# Patient Record
Sex: FEMALE | Race: WHITE | NOT HISPANIC OR LATINO | URBAN - METROPOLITAN AREA
[De-identification: names, ages, dates, MRNs, and addresses within clinical notes are randomized per-mention and may not be internally consistent; named-entity substitution may affect disease eponyms.]

---

## 2017-05-06 ENCOUNTER — OUTPATIENT (OUTPATIENT)
Dept: OUTPATIENT SERVICES | Facility: HOSPITAL | Age: 67
LOS: 1 days | Discharge: HOME | End: 2017-05-06

## 2017-06-28 DIAGNOSIS — R07.9 CHEST PAIN, UNSPECIFIED: ICD-10-CM

## 2018-06-16 ENCOUNTER — OUTPATIENT (OUTPATIENT)
Dept: OUTPATIENT SERVICES | Facility: HOSPITAL | Age: 68
LOS: 1 days | Discharge: HOME | End: 2018-06-16

## 2018-06-16 DIAGNOSIS — E78.2 MIXED HYPERLIPIDEMIA: ICD-10-CM

## 2018-06-16 DIAGNOSIS — Z00.00 ENCOUNTER FOR GENERAL ADULT MEDICAL EXAMINATION WITHOUT ABNORMAL FINDINGS: ICD-10-CM

## 2018-06-16 DIAGNOSIS — E55.9 VITAMIN D DEFICIENCY, UNSPECIFIED: ICD-10-CM

## 2018-06-16 DIAGNOSIS — I10 ESSENTIAL (PRIMARY) HYPERTENSION: ICD-10-CM

## 2018-10-06 ENCOUNTER — TRANSCRIPTION ENCOUNTER (OUTPATIENT)
Age: 68
End: 2018-10-06

## 2018-10-27 ENCOUNTER — OUTPATIENT (OUTPATIENT)
Dept: OUTPATIENT SERVICES | Facility: HOSPITAL | Age: 68
LOS: 1 days | Discharge: HOME | End: 2018-10-27

## 2018-10-27 DIAGNOSIS — R10.13 EPIGASTRIC PAIN: ICD-10-CM

## 2019-03-07 ENCOUNTER — OUTPATIENT (OUTPATIENT)
Dept: OUTPATIENT SERVICES | Facility: HOSPITAL | Age: 69
LOS: 1 days | Discharge: HOME | End: 2019-03-07

## 2019-03-07 DIAGNOSIS — E55.9 VITAMIN D DEFICIENCY, UNSPECIFIED: ICD-10-CM

## 2019-03-07 DIAGNOSIS — I10 ESSENTIAL (PRIMARY) HYPERTENSION: ICD-10-CM

## 2019-03-07 DIAGNOSIS — K21.0 GASTRO-ESOPHAGEAL REFLUX DISEASE WITH ESOPHAGITIS: ICD-10-CM

## 2019-03-07 DIAGNOSIS — E78.2 MIXED HYPERLIPIDEMIA: ICD-10-CM

## 2019-03-07 DIAGNOSIS — Z00.01 ENCOUNTER FOR GENERAL ADULT MEDICAL EXAMINATION WITH ABNORMAL FINDINGS: ICD-10-CM

## 2019-05-14 ENCOUNTER — APPOINTMENT (OUTPATIENT)
Dept: CARDIOLOGY | Facility: CLINIC | Age: 69
End: 2019-05-14
Payer: MEDICARE

## 2019-05-14 PROCEDURE — 93306 TTE W/DOPPLER COMPLETE: CPT

## 2019-05-28 ENCOUNTER — APPOINTMENT (OUTPATIENT)
Dept: CARDIOLOGY | Facility: CLINIC | Age: 69
End: 2019-05-28
Payer: MEDICARE

## 2019-05-28 PROCEDURE — 93000 ELECTROCARDIOGRAM COMPLETE: CPT

## 2019-05-28 PROCEDURE — 99214 OFFICE O/P EST MOD 30 MIN: CPT

## 2019-12-10 ENCOUNTER — APPOINTMENT (OUTPATIENT)
Dept: CARDIOLOGY | Facility: CLINIC | Age: 69
End: 2019-12-10
Payer: MEDICARE

## 2019-12-10 PROCEDURE — 99214 OFFICE O/P EST MOD 30 MIN: CPT

## 2019-12-10 PROCEDURE — 93000 ELECTROCARDIOGRAM COMPLETE: CPT

## 2020-04-10 ENCOUNTER — APPOINTMENT (OUTPATIENT)
Dept: OTOLARYNGOLOGY | Facility: CLINIC | Age: 70
End: 2020-04-10

## 2020-05-18 ENCOUNTER — LABORATORY RESULT (OUTPATIENT)
Age: 70
End: 2020-05-18

## 2020-05-19 ENCOUNTER — APPOINTMENT (OUTPATIENT)
Dept: CARDIOLOGY | Facility: CLINIC | Age: 70
End: 2020-05-19
Payer: MEDICARE

## 2020-05-19 PROCEDURE — 93306 TTE W/DOPPLER COMPLETE: CPT

## 2020-05-26 ENCOUNTER — APPOINTMENT (OUTPATIENT)
Dept: CARDIOLOGY | Facility: CLINIC | Age: 70
End: 2020-05-26
Payer: MEDICARE

## 2020-05-26 PROCEDURE — 99214 OFFICE O/P EST MOD 30 MIN: CPT

## 2020-05-26 PROCEDURE — 93000 ELECTROCARDIOGRAM COMPLETE: CPT

## 2020-09-29 ENCOUNTER — RECORD ABSTRACTING (OUTPATIENT)
Age: 70
End: 2020-09-29

## 2020-09-29 DIAGNOSIS — Z78.9 OTHER SPECIFIED HEALTH STATUS: ICD-10-CM

## 2020-09-29 DIAGNOSIS — I65.23 OCCLUSION AND STENOSIS OF BILATERAL CAROTID ARTERIES: ICD-10-CM

## 2020-09-29 DIAGNOSIS — I35.1 NONRHEUMATIC AORTIC (VALVE) INSUFFICIENCY: ICD-10-CM

## 2020-09-29 DIAGNOSIS — I36.0 NONRHEUMATIC TRICUSPID (VALVE) STENOSIS: ICD-10-CM

## 2020-11-10 ENCOUNTER — APPOINTMENT (OUTPATIENT)
Dept: CARDIOLOGY | Facility: CLINIC | Age: 70
End: 2020-11-10
Payer: MEDICARE

## 2020-11-10 VITALS
HEART RATE: 67 BPM | HEIGHT: 66 IN | SYSTOLIC BLOOD PRESSURE: 150 MMHG | TEMPERATURE: 97.7 F | BODY MASS INDEX: 26.52 KG/M2 | DIASTOLIC BLOOD PRESSURE: 80 MMHG | WEIGHT: 165 LBS

## 2020-11-10 PROCEDURE — 99072 ADDL SUPL MATRL&STAF TM PHE: CPT

## 2020-11-10 PROCEDURE — 99213 OFFICE O/P EST LOW 20 MIN: CPT

## 2020-11-10 PROCEDURE — 93000 ELECTROCARDIOGRAM COMPLETE: CPT

## 2021-05-22 ENCOUNTER — TRANSCRIPTION ENCOUNTER (OUTPATIENT)
Age: 71
End: 2021-05-22

## 2021-06-04 ENCOUNTER — APPOINTMENT (OUTPATIENT)
Dept: CARDIOLOGY | Facility: CLINIC | Age: 71
End: 2021-06-04
Payer: MEDICARE

## 2021-06-04 PROCEDURE — 93306 TTE W/DOPPLER COMPLETE: CPT

## 2021-06-15 ENCOUNTER — APPOINTMENT (OUTPATIENT)
Dept: CARDIOLOGY | Facility: CLINIC | Age: 71
End: 2021-06-15
Payer: MEDICARE

## 2021-06-15 VITALS
WEIGHT: 172 LBS | TEMPERATURE: 97.8 F | HEIGHT: 66 IN | DIASTOLIC BLOOD PRESSURE: 80 MMHG | BODY MASS INDEX: 27.64 KG/M2 | SYSTOLIC BLOOD PRESSURE: 140 MMHG | HEART RATE: 60 BPM | OXYGEN SATURATION: 98 %

## 2021-06-15 PROCEDURE — 93000 ELECTROCARDIOGRAM COMPLETE: CPT

## 2021-06-15 PROCEDURE — 99213 OFFICE O/P EST LOW 20 MIN: CPT

## 2021-06-15 RX ORDER — LATANOPROST/PF 0.005 %
0.01 DROPS OPHTHALMIC (EYE)
Refills: 0 | Status: DISCONTINUED | COMMUNITY

## 2021-06-18 ENCOUNTER — APPOINTMENT (OUTPATIENT)
Dept: OTOLARYNGOLOGY | Facility: CLINIC | Age: 71
End: 2021-06-18
Payer: MEDICARE

## 2021-06-18 DIAGNOSIS — H92.09 OTALGIA, UNSPECIFIED EAR: ICD-10-CM

## 2021-06-18 DIAGNOSIS — H91.93 UNSPECIFIED HEARING LOSS, BILATERAL: ICD-10-CM

## 2021-06-18 PROCEDURE — 92550 TYMPANOMETRY & REFLEX THRESH: CPT

## 2021-06-18 PROCEDURE — 92557 COMPREHENSIVE HEARING TEST: CPT

## 2021-06-18 PROCEDURE — 99203 OFFICE O/P NEW LOW 30 MIN: CPT | Mod: 25

## 2021-06-18 NOTE — PHYSICAL EXAM
[Midline] : trachea located in midline position [Normal] : no rashes [de-identified] : left tmj pain on palpation. [de-identified] : hypertrophic engorged

## 2021-06-18 NOTE — ASSESSMENT
[FreeTextEntry1] : I reviewed, interpreted, and discussed the Audiogram done today. bilateral mild hearing loss. Left mixed. \par \par I explained to the patient the pathophysiology of TMJ dysfunction, causing referred otalgia. I showed the impact of an  uneven bite/occlusion. \par During painful episodes, I recommended using slightly warm compresses to relieve the spasm of the masticators muscles, eating soft food, masticating on both sides of the jaw instead of one side,  in addition to using NSAIDs. I also explained the risks of side effects related to NSAIDs including stomach ulcers and recommended gastric protection while using NSAIDs. \par I also discussed the importance of seeing the dentist to align the bite to avoid a recurrence of the problem down the road.\par \par

## 2021-06-18 NOTE — HISTORY OF PRESENT ILLNESS
[de-identified] : Patient presents today c/o  right ear pain . Started 1 month ago.   Was seen in Urgent care was prescribed antibiotic .  Pain was in left ear. Denies any history of ear infections.  She remains with tinnitus at right which is constant.

## 2022-04-12 ENCOUNTER — APPOINTMENT (OUTPATIENT)
Dept: CARDIOLOGY | Facility: CLINIC | Age: 72
End: 2022-04-12
Payer: MEDICARE

## 2022-04-12 VITALS
BODY MASS INDEX: 26.97 KG/M2 | WEIGHT: 167.8 LBS | HEIGHT: 66 IN | DIASTOLIC BLOOD PRESSURE: 78 MMHG | SYSTOLIC BLOOD PRESSURE: 140 MMHG

## 2022-04-12 PROCEDURE — 93000 ELECTROCARDIOGRAM COMPLETE: CPT

## 2022-04-12 PROCEDURE — 99213 OFFICE O/P EST LOW 20 MIN: CPT

## 2022-04-12 RX ORDER — ASPIRIN ENTERIC COATED TABLETS 81 MG 81 MG/1
81 TABLET, DELAYED RELEASE ORAL DAILY
Qty: 90 | Refills: 3 | Status: ACTIVE | COMMUNITY
Start: 2022-04-12 | End: 1900-01-01

## 2022-04-12 RX ORDER — METOPROLOL SUCCINATE 25 MG/1
25 TABLET, EXTENDED RELEASE ORAL DAILY
Qty: 90 | Refills: 3 | Status: DISCONTINUED | COMMUNITY
End: 2022-04-12

## 2022-05-26 ENCOUNTER — NON-APPOINTMENT (OUTPATIENT)
Age: 72
End: 2022-05-26

## 2022-05-27 ENCOUNTER — EMERGENCY (EMERGENCY)
Facility: HOSPITAL | Age: 72
LOS: 0 days | Discharge: HOME | End: 2022-05-28
Attending: STUDENT IN AN ORGANIZED HEALTH CARE EDUCATION/TRAINING PROGRAM | Admitting: STUDENT IN AN ORGANIZED HEALTH CARE EDUCATION/TRAINING PROGRAM
Payer: MEDICARE

## 2022-05-27 VITALS
HEART RATE: 67 BPM | OXYGEN SATURATION: 99 % | RESPIRATION RATE: 17 BRPM | WEIGHT: 169.98 LBS | DIASTOLIC BLOOD PRESSURE: 113 MMHG | TEMPERATURE: 97 F | SYSTOLIC BLOOD PRESSURE: 245 MMHG | HEIGHT: 66 IN

## 2022-05-27 DIAGNOSIS — I10 ESSENTIAL (PRIMARY) HYPERTENSION: ICD-10-CM

## 2022-05-27 LAB
ALBUMIN SERPL ELPH-MCNC: 4.5 G/DL — SIGNIFICANT CHANGE UP (ref 3.5–5.2)
ALP SERPL-CCNC: 56 U/L — SIGNIFICANT CHANGE UP (ref 30–115)
ALT FLD-CCNC: 14 U/L — SIGNIFICANT CHANGE UP (ref 0–41)
ANION GAP SERPL CALC-SCNC: 11 MMOL/L — SIGNIFICANT CHANGE UP (ref 7–14)
APPEARANCE UR: CLEAR — SIGNIFICANT CHANGE UP
AST SERPL-CCNC: 22 U/L — SIGNIFICANT CHANGE UP (ref 0–41)
BASOPHILS # BLD AUTO: 0.02 K/UL — SIGNIFICANT CHANGE UP (ref 0–0.2)
BASOPHILS NFR BLD AUTO: 0.3 % — SIGNIFICANT CHANGE UP (ref 0–1)
BILIRUB SERPL-MCNC: <0.2 MG/DL — SIGNIFICANT CHANGE UP (ref 0.2–1.2)
BILIRUB UR-MCNC: NEGATIVE — SIGNIFICANT CHANGE UP
BUN SERPL-MCNC: 12 MG/DL — SIGNIFICANT CHANGE UP (ref 10–20)
CALCIUM SERPL-MCNC: 9.5 MG/DL — SIGNIFICANT CHANGE UP (ref 8.5–10.1)
CHLORIDE SERPL-SCNC: 100 MMOL/L — SIGNIFICANT CHANGE UP (ref 98–110)
CO2 SERPL-SCNC: 25 MMOL/L — SIGNIFICANT CHANGE UP (ref 17–32)
COLOR SPEC: YELLOW — SIGNIFICANT CHANGE UP
CREAT SERPL-MCNC: 0.8 MG/DL — SIGNIFICANT CHANGE UP (ref 0.7–1.5)
DIFF PNL FLD: NEGATIVE — SIGNIFICANT CHANGE UP
EGFR: 79 ML/MIN/1.73M2 — SIGNIFICANT CHANGE UP
EOSINOPHIL # BLD AUTO: 0.13 K/UL — SIGNIFICANT CHANGE UP (ref 0–0.7)
EOSINOPHIL NFR BLD AUTO: 2.3 % — SIGNIFICANT CHANGE UP (ref 0–8)
EPI CELLS # UR: ABNORMAL /HPF
GLUCOSE SERPL-MCNC: 110 MG/DL — HIGH (ref 70–99)
GLUCOSE UR QL: NEGATIVE MG/DL — SIGNIFICANT CHANGE UP
HCT VFR BLD CALC: 38.8 % — SIGNIFICANT CHANGE UP (ref 37–47)
HGB BLD-MCNC: 12.8 G/DL — SIGNIFICANT CHANGE UP (ref 12–16)
IMM GRANULOCYTES NFR BLD AUTO: 0.2 % — SIGNIFICANT CHANGE UP (ref 0.1–0.3)
KETONES UR-MCNC: NEGATIVE — SIGNIFICANT CHANGE UP
LEUKOCYTE ESTERASE UR-ACNC: ABNORMAL
LYMPHOCYTES # BLD AUTO: 2.27 K/UL — SIGNIFICANT CHANGE UP (ref 1.2–3.4)
LYMPHOCYTES # BLD AUTO: 39.5 % — SIGNIFICANT CHANGE UP (ref 20.5–51.1)
MCHC RBC-ENTMCNC: 29.6 PG — SIGNIFICANT CHANGE UP (ref 27–31)
MCHC RBC-ENTMCNC: 33 G/DL — SIGNIFICANT CHANGE UP (ref 32–37)
MCV RBC AUTO: 89.8 FL — SIGNIFICANT CHANGE UP (ref 81–99)
MONOCYTES # BLD AUTO: 0.43 K/UL — SIGNIFICANT CHANGE UP (ref 0.1–0.6)
MONOCYTES NFR BLD AUTO: 7.5 % — SIGNIFICANT CHANGE UP (ref 1.7–9.3)
NEUTROPHILS # BLD AUTO: 2.89 K/UL — SIGNIFICANT CHANGE UP (ref 1.4–6.5)
NEUTROPHILS NFR BLD AUTO: 50.2 % — SIGNIFICANT CHANGE UP (ref 42.2–75.2)
NITRITE UR-MCNC: NEGATIVE — SIGNIFICANT CHANGE UP
NRBC # BLD: 0 /100 WBCS — SIGNIFICANT CHANGE UP (ref 0–0)
NT-PROBNP SERPL-SCNC: 157 PG/ML — SIGNIFICANT CHANGE UP (ref 0–300)
PH UR: 7 — SIGNIFICANT CHANGE UP (ref 5–8)
PLATELET # BLD AUTO: 195 K/UL — SIGNIFICANT CHANGE UP (ref 130–400)
POTASSIUM SERPL-MCNC: 3.8 MMOL/L — SIGNIFICANT CHANGE UP (ref 3.5–5)
POTASSIUM SERPL-SCNC: 3.8 MMOL/L — SIGNIFICANT CHANGE UP (ref 3.5–5)
PROT SERPL-MCNC: 7.3 G/DL — SIGNIFICANT CHANGE UP (ref 6–8)
PROT UR-MCNC: NEGATIVE MG/DL — SIGNIFICANT CHANGE UP
RBC # BLD: 4.32 M/UL — SIGNIFICANT CHANGE UP (ref 4.2–5.4)
RBC # FLD: 11.9 % — SIGNIFICANT CHANGE UP (ref 11.5–14.5)
SODIUM SERPL-SCNC: 136 MMOL/L — SIGNIFICANT CHANGE UP (ref 135–146)
SP GR SPEC: 1.01 — SIGNIFICANT CHANGE UP (ref 1.01–1.03)
TROPONIN T SERPL-MCNC: <0.01 NG/ML — SIGNIFICANT CHANGE UP
UROBILINOGEN FLD QL: 0.2 MG/DL — SIGNIFICANT CHANGE UP
WBC # BLD: 5.75 K/UL — SIGNIFICANT CHANGE UP (ref 4.8–10.8)
WBC # FLD AUTO: 5.75 K/UL — SIGNIFICANT CHANGE UP (ref 4.8–10.8)
WBC UR QL: SIGNIFICANT CHANGE UP /HPF

## 2022-05-27 PROCEDURE — 93010 ELECTROCARDIOGRAM REPORT: CPT

## 2022-05-27 PROCEDURE — 99285 EMERGENCY DEPT VISIT HI MDM: CPT

## 2022-05-27 PROCEDURE — 99053 MED SERV 10PM-8AM 24 HR FAC: CPT

## 2022-05-27 RX ORDER — METOPROLOL TARTRATE 50 MG
0 TABLET ORAL
Qty: 0 | Refills: 0 | DISCHARGE

## 2022-05-27 RX ORDER — LISINOPRIL 2.5 MG/1
0 TABLET ORAL
Qty: 0 | Refills: 0 | DISCHARGE

## 2022-05-27 RX ORDER — AMLODIPINE BESYLATE 2.5 MG/1
2.5 TABLET ORAL ONCE
Refills: 0 | Status: COMPLETED | OUTPATIENT
Start: 2022-05-27 | End: 2022-05-27

## 2022-05-27 RX ADMIN — AMLODIPINE BESYLATE 2.5 MILLIGRAM(S): 2.5 TABLET ORAL at 23:55

## 2022-05-27 NOTE — ED PROVIDER NOTE - OBJECTIVE STATEMENT
Patient is a 72-year-old female with past medical history of hypertension currently on metoprolol and losartan presenting with high blood pressure.  Patient reports today she felt pressure like sensation in her head and decided to check her blood pressure.  At the time blood pressure was in the 240s systolic.  Patient took an extra metoprolol and an extra losartan ~ 3 hours ago and later rechecked her pressure and realized it was unchanged so she presented for eval.  She denies any chest pain or shortness of breath no hematuria.  No abdominal pain.No nausea or vomiting, no dizziness,

## 2022-05-27 NOTE — ED PROVIDER NOTE - NS ED MD DISPO DISCHARGE CCDA
Patient/Caregiver provided printed discharge information.
Continue DASH/TLC diet. Defer consistency/texture to team/SLP/GOC. Monitor/adjust as needed./yes

## 2022-05-27 NOTE — ED PROVIDER NOTE - PHYSICAL EXAMINATION
CONSTITUTIONAL: Well-developed; well-nourished; in no acute distress.   SKIN: warm, dry  HEAD: Normocephalic; atraumatic.  EYES: PERRL, EOMI, normal sclera and conjunctiva   ENT: No nasal discharge; airway clear.  NECK: Supple; non tender.  CARD:  Regular rate and rhythm.   RESP: NO inc WOB , speaking in full sentences, lungs CTAB  ABD: soft ntnd  EXT: Normal ROM.  no LE edema no unilateral leg swelling  NEURO: NEURO: AAO x 3, normal speech, no facial asymmetry, negative pronator drift, no ataxia,  sensory equal and intact.  PSYCH: Cooperative, appropriate.

## 2022-05-27 NOTE — ED PROVIDER NOTE - CARE PROVIDERS DIRECT ADDRESSES
[email or web address]

## 2022-05-27 NOTE — ED PROVIDER NOTE - CLINICAL SUMMARY MEDICAL DECISION MAKING FREE TEXT BOX
72-year-old female with past medical history HTN, HLD presents to the ER for elevated blood pressure reading at home with systolic in 240 mmHg.  She is asymptomatic otherwise.  She took all of her blood pressure medications this morning.  Denies chest pain, headache, focal weakness or numbness, urinary changes.  On arrival blood pressure 245/113. Gen - NAD, Head - NCAT, Pharynx - clear, MMM, Heart - RRR, no m/g/r, Lungs - CTAB, no w/c/r, Abdomen - soft, NT, ND, Skin - No rash, Extremities - FROM, no edema, erythema, ecchymosis, brisk cap refill, Neuro - A&O x3, equal strength and sensation, non-focal exam. EKG shows no evidence of LVH, TAWNY, STD. Labs reviewed. Serial BP elevated SBP >240mmHg causing family concern therefore pt offered low dose po amlodipine x1. On reassessment pt Asymptomatic, exam is unremarkable so she was given close follow-up, return precautions and she verbalized understanding.  All questions answered at the bedside. 72-year-old female with past medical history HTN, HLD presents to the ER for elevated blood pressure reading at home with systolic in 240 mmHg.  She is asymptomatic otherwise.  She took all of her blood pressure medications this morning.  Denies chest pain, headache, focal weakness or numbness, urinary changes.  On arrival blood pressure 245/113. Gen - NAD, Head - NCAT, Pharynx - clear, MMM, Heart - RRR, no m/g/r, Lungs - CTAB, no w/c/r, Abdomen - soft, NT, ND, Skin - No rash, Extremities - FROM, no edema, erythema, ecchymosis, brisk cap refill, Neuro - A&O x3, equal strength and sensation, non-focal exam. EKG shows no evidence of LVH, TAWNY, STD. Labs reviewed. Serial BP elevated SBP >240mmHg causing family concern therefore pt offered low dose po amlodipine x1. BP improved, On reassessment pt Asymptomatic, exam is unremarkable so she was given close follow-up, return precautions and she verbalized understanding.  All questions answered at the bedside.

## 2022-05-27 NOTE — ED PROVIDER NOTE - CARE PROVIDER_API CALL
George Zavala)  Family Medicine  [address], [address]  [place] [address]  Phone: [phone]  Fax: [phone]  Follow Up Time:

## 2022-05-27 NOTE — ED PROVIDER NOTE - PATIENT PORTAL LINK FT
You can access the FollowMyHealth Patient Portal offered by [facility] by registering at the following website: [email or web address]. By joining [facility]’s FollowMyHealth portal, you will also be able to view your health information using other applications (apps) compatible with our system.

## 2022-05-27 NOTE — ED PROVIDER NOTE - NS ED ROS FT
Constitutional: (-) fever  Eyes/ENT: (-) blurry vision, (-) epistaxis  Cardiovascular: (-) chest pain, (-) syncope  Respiratory: (-) cough, (-) shortness of breath  Gastrointestinal: (-) vomiting, (-) diarrhea  Musculoskeletal: (-) neck pain, (-) back pain, (-) joint pain  Integumentary: (-) rash, (-) edema  Neurological:  (-) altered mental status  Psychiatric: (-) hallucinations  Allergic/Immunologic: (-) pruritus

## 2022-05-28 VITALS — SYSTOLIC BLOOD PRESSURE: 204 MMHG | DIASTOLIC BLOOD PRESSURE: 93 MMHG | HEART RATE: 65 BPM

## 2022-07-19 NOTE — ED ADULT NURSE NOTE - NSFALLRSKINDICATORS_ED_ALL_ED
no treatment      Pain:   Pain reported in back and LLE. RN aware      Social/Functional History  Lives With: Alone  Type of Home: Senior housing apartment  Home Layout: One level  Home Access: Elevator  Bathroom Shower/Tub: Walk-in shower  Bathroom Toilet: Handicap height  Bathroom Equipment: Grab bars in shower, Shower chair, Hand-held shower, Grab bars around toilet  Home Equipment: [address], Rollator  Has the patient had two or more falls in the past year or any fall with injury in the past year?: No  ADL Assistance: [address]: Needs assistance (cleaning, laundry, meals on wheels)  Ambulation Assistance: Independent (uses rollator outside of apartment and \"when I'm in a crisis. \")  Transfer Assistance: Independent  Active : Yes  Occupation: Retired (Manager)  Additional Comments: Pt manages own medications  Prior Function  ADL Assistance: Independent  Homemaking Assistance: Needs assistance (cleaning, laundry, meals on wheels)  Ambulation Assistance: Independent (uses rollator outside of apartment and \"when I'm in a crisis. \")  Transfer Assistance: Independent  Additional Comments: Pt manages own medications    Objective:    Cognition/Orientation:  Increased processing time and Vcs for sequencing tasks    Bed mobility   Supine to sit: CGA with Vcs and Tactile cues for sequencing with increased effort  Scooting: Mod A for lateral scooting and scooting to EOB    Functional Mobility   Sit to Stand: max A x2 for stance to [place]. Pt Maintaining stance for bowel clean up requiring Mod A progressing to Min A  Stand to Sit: Mod A x2  Bed to Chair Transfer: Max A x2 for squat pivot transfer  Other: Sitting EOB for ADLs with R posterior lean requiring SBA to Min A     ADLs   Grooming: Set up for oral care and washing face seated EOB   UB dressing: CGA to Min A donning gown  LB dressing: Max A donning sock  Toileting:  Total assist for briefs and bowel incontinence clean up     Activity Tolerance:  Pt tolerated treatment well. Motivated for therapy      Patient Education:   Pressure relief, role of therapy, NWB precautions    Safety Devices in Place:  Pt left in chair with alarm on and call light in reach. Goals:  Short Term Goals  Time Frame for Short term goals: Discharge  Short Term Goal 1: supine to sit w/ Min A - goal met 7/19,NEW GOAL: supine to sit w/SBA-not met  Short Term Goal 2: chair pushups x 5 w/ Min A, NWB LLE -ongoing, continue  Short Term Goal 3: lateral scooting at EOB w/ Mod A, NWB LLE -ongoing, continue      Therapy Time:   Individual Concurrent Group Co-treatment   Time In  1358         Time Out  1443         Minutes  45           Timed Code Treatment Minutes:  45    Total Treatment Minutes:  45   Plan:      Times per Week: 2-5   Times per Day: Daily    If patient is discharged prior to next treatment, this note will serve as the discharge summary.       [address] [place] [address], Ne, AHSAN/L

## 2022-10-11 ENCOUNTER — APPOINTMENT (OUTPATIENT)
Dept: CARDIOLOGY | Facility: CLINIC | Age: 72
End: 2022-10-11

## 2022-10-11 VITALS
DIASTOLIC BLOOD PRESSURE: 86 MMHG | SYSTOLIC BLOOD PRESSURE: 140 MMHG | HEIGHT: 66 IN | HEART RATE: 60 BPM | BODY MASS INDEX: 27.64 KG/M2 | WEIGHT: 172 LBS | OXYGEN SATURATION: 96 %

## 2022-10-11 DIAGNOSIS — I34.0 NONRHEUMATIC MITRAL (VALVE) INSUFFICIENCY: ICD-10-CM

## 2022-10-11 PROBLEM — I10 ESSENTIAL (PRIMARY) HYPERTENSION: Chronic | Status: ACTIVE | Noted: 2022-05-27

## 2022-10-11 PROCEDURE — 99214 OFFICE O/P EST MOD 30 MIN: CPT

## 2022-10-11 PROCEDURE — 93000 ELECTROCARDIOGRAM COMPLETE: CPT

## 2022-10-11 RX ORDER — ASPIRIN 81 MG
81 TABLET, DELAYED RELEASE (ENTERIC COATED) ORAL DAILY
Refills: 0 | Status: DISCONTINUED | COMMUNITY
End: 2022-10-11

## 2022-10-11 RX ORDER — METOPROLOL SUCCINATE 100 MG/1
100 TABLET, EXTENDED RELEASE ORAL DAILY
Qty: 90 | Refills: 3 | Status: DISCONTINUED | COMMUNITY
End: 2022-10-11

## 2022-10-11 NOTE — HISTORY OF PRESENT ILLNESS
[FreeTextEntry1] : Ms. Cheung is a 72-year-old woman with history of HTN, hyperlipidemia, and mitral regurgitation presenting for follow up.\par \par Patient previously followed with Dr. Almazan and was last seen in clinic on 4/2022. At that time was noted to be doing well without complaints. She was hypertensive to 148/78 and was started on amlodipine.\par \par Today remains hypertensive at home, sometimes systolics run as high as the 170s.\par Her mother and father both passed away young (40-50s) but not from cardiac issues.\par \par She denies cardiopulmonary complaints including chest pain, dyspnea, palpitations, pre-syncope, syncope, LE swelling, PND, or orthopnea.\par \par Labs:\par - 2021: Cr 0.8, K 4.7, Hgb 12.6, Vit D 29 (L)\par - 2021: , TG 74, HDL 55, LDL 88, non-\par \par TTE 2021\par - Normal biventricular function, mild MR, trivial AI, mild/mod TR, aortic sclerosis\par \par

## 2022-10-11 NOTE — ASSESSMENT
[FreeTextEntry1] : Ms. Cheung is a 72-year-old woman with history of HTN, hyperlipidemia, and mitral regurgitation presenting for follow up.\par \par Impression:\par (1) HTN, poorly controlled\par (2) HLD, LDL <100\par (3) Mild/mod MR, no sx but only exerts herself to a low workload\par \par Plan:\par - Switch metoprolol to carvedilol for improved BP control. If another agent is required would opt for chlorthalidone or HCTZ.\par - Continue amlodipine 5mg daily and lisinopril 40mg daily.\par - Continue statin\par - OK to continue aspirin for now given no history of bleeding events and well tolerability. No strong indication based on recent guideline changes. Will readdress on next visit.\par - Repeat TTE to reassess mitral regurgitation severity

## 2023-03-09 ENCOUNTER — APPOINTMENT (OUTPATIENT)
Dept: CARDIOLOGY | Facility: CLINIC | Age: 73
End: 2023-03-09
Payer: MEDICARE

## 2023-03-09 PROCEDURE — 93306 TTE W/DOPPLER COMPLETE: CPT

## 2023-03-17 RX ORDER — CARVEDILOL 12.5 MG/1
12.5 TABLET, FILM COATED ORAL TWICE DAILY
Qty: 180 | Refills: 2 | Status: ACTIVE | COMMUNITY
Start: 2022-10-11 | End: 1900-01-01

## 2023-04-11 ENCOUNTER — APPOINTMENT (OUTPATIENT)
Dept: CARDIOLOGY | Facility: CLINIC | Age: 73
End: 2023-04-11
Payer: MEDICARE

## 2023-04-11 VITALS
HEIGHT: 66 IN | WEIGHT: 176 LBS | HEART RATE: 66 BPM | DIASTOLIC BLOOD PRESSURE: 82 MMHG | OXYGEN SATURATION: 95 % | BODY MASS INDEX: 28.28 KG/M2 | SYSTOLIC BLOOD PRESSURE: 140 MMHG

## 2023-04-11 PROCEDURE — 99214 OFFICE O/P EST MOD 30 MIN: CPT

## 2023-04-11 PROCEDURE — 93000 ELECTROCARDIOGRAM COMPLETE: CPT

## 2023-04-11 NOTE — ASSESSMENT
[FreeTextEntry1] : Ms. Cheung is a 72-year-old woman with history of HTN, hyperlipidemia, and mitral regurgitation presenting for follow up.\par \par Impression:\par (1) HTN, poorly controlled\par (2) HLD, LDL <100\par (3) Mild/mod MR, no sx but only exerts herself to a low workload\par \par Plan:\par - Switch lisinopril to lisinopril-HCTZ 20-12.5mg daily\par - Increase amlodipine from 5mg to 10mg daily\par - Continue carvedilol 12.5mg PO BID, dose limited by bradycardia\par - Continue statin\par - OK to continue aspirin for now given no history of bleeding events and well tolerability. No strong indication based on recent guideline changes.\par - Obtain cardiometabolic labs.\par \par If BP remains suboptimally controlled can increase lisinopril HCTZ to 20-25mg daily and consider adding on spironolactone.\par \par RTC prior to leaving for [facility] this summer.

## 2023-04-11 NOTE — HISTORY OF PRESENT ILLNESS
[FreeTextEntry1] : Ms. Cheung is a 73-year-old woman with history of HTN, hyperlipidemia, and mitral regurgitation presenting for follow up.\par \par Patient previously followed with Dr. Almazan and was last seen in clinic on 4/2022. At that time was noted to be doing well without complaints. She was hypertensive to 148/78 and was started on amlodipine.\par \par Today remains hypertensive at home.\par Her mother and father both passed away young (40-50s) but not from cardiac issues.\par \par She denies cardiopulmonary complaints including chest pain, dyspnea, palpitations, pre-syncope, syncope, LE swelling, PND, or orthopnea.\par \par Labs:\par - 2021: Cr 0.8, K 4.7, Hgb 12.6, Vit D 29 (L)\par - 2021: , TG 74, HDL 55, LDL 88, non-\par \par TTE 3/2023\par - Normal biventricular function, mild MR, fibrocalcific aortic valve\par \par TTE 2021\par - Normal biventricular function, mild MR, trivial AI, mild/mod TR, aortic sclerosis\par \par

## 2023-05-19 ENCOUNTER — LABORATORY RESULT (OUTPATIENT)
Age: 73
End: 2023-05-19

## 2023-05-19 LAB
ANION GAP SERPL CALC-SCNC: 7 MMOL/L
BUN SERPL-MCNC: 14 MG/DL
CALCIUM SERPL-MCNC: 10 MG/DL
CHLORIDE SERPL-SCNC: 106 MMOL/L
CHOLEST SERPL-MCNC: 154 MG/DL
CO2 SERPL-SCNC: 29 MMOL/L
CREAT SERPL-MCNC: 0.9 MG/DL
EGFR: 68 ML/MIN/1.73M2
ESTIMATED AVERAGE GLUCOSE: 114 MG/DL
GLUCOSE SERPL-MCNC: 99 MG/DL
HBA1C MFR BLD HPLC: 5.6 %
HDLC SERPL-MCNC: 59 MG/DL
LDLC SERPL CALC-MCNC: 78 MG/DL
NONHDLC SERPL-MCNC: 95 MG/DL
POTASSIUM SERPL-SCNC: 4 MMOL/L
SODIUM SERPL-SCNC: 142 MMOL/L
TRIGL SERPL-MCNC: 86 MG/DL

## 2023-06-13 ENCOUNTER — APPOINTMENT (OUTPATIENT)
Dept: CARDIOLOGY | Facility: CLINIC | Age: 73
End: 2023-06-13
Payer: MEDICARE

## 2023-06-13 VITALS
BODY MASS INDEX: 29.41 KG/M2 | HEART RATE: 69 BPM | SYSTOLIC BLOOD PRESSURE: 136 MMHG | HEIGHT: 66 IN | WEIGHT: 183 LBS | DIASTOLIC BLOOD PRESSURE: 80 MMHG

## 2023-06-13 DIAGNOSIS — M79.89 OTHER SPECIFIED SOFT TISSUE DISORDERS: ICD-10-CM

## 2023-06-13 PROCEDURE — 93000 ELECTROCARDIOGRAM COMPLETE: CPT

## 2023-06-13 PROCEDURE — 99214 OFFICE O/P EST MOD 30 MIN: CPT

## 2023-06-13 RX ORDER — LISINOPRIL 40 MG/1
40 TABLET ORAL DAILY
Qty: 3 | Refills: 3 | Status: DISCONTINUED | COMMUNITY
End: 2023-06-13

## 2023-06-13 RX ORDER — AMLODIPINE BESYLATE 10 MG/1
10 TABLET ORAL DAILY
Qty: 90 | Refills: 3 | Status: DISCONTINUED | COMMUNITY
Start: 2022-04-12 | End: 2023-06-13

## 2023-06-13 NOTE — HISTORY OF PRESENT ILLNESS
[FreeTextEntry1] : Ms. Cheung is a 73-year-old woman with history of HTN, hyperlipidemia, and mitral regurgitation presenting for follow up.\par \par Patient previously followed with Dr. Almazan and was last seen in clinic on 4/2022. At that time was noted to be doing well without complaints. She was hypertensive to 148/78 and was started on amlodipine.\par \par She denies cardiopulmonary complaints including chest pain, dyspnea, palpitations, pre-syncope, syncope, PND, or orthopnea.\par \par She did develop LE swelling while on amlodipine 10mg and was switched to 5mg recently. Symptoms are worst at night and almost completely resolve in the AM.\par \par Her mother and father both passed away young (40-50s) but not from cardiac issues.\par \par Labs:\par - 2021: Cr 0.8, K 4.7, Hgb 12.6, Vit D 29 (L)\par - 2021: , TG 74, HDL 55, LDL 88, non-\par \par TTE 3/2023 - Normal biventricular function, mild MR, fibrocalcific aortic valve\par TTE 2021 - Normal biventricular function, mild MR, trivial AI, mild/mod TR, aortic sclerosis\par \par

## 2023-06-13 NOTE — ASSESSMENT
[FreeTextEntry1] : Ms. Cheung is a 73-year-old woman with history of HTN, hyperlipidemia, and mitral regurgitation presenting for follow up.\par \par Impression:\par (1) HTN, suboptimal control\par (2) HLD, LDL <100\par (3) Mild/mod MR, no sx but only exerts herself to a low workload\par \par Plan:\par - Patient is leaving to [facility] for 4 months and needs a prolonged supply of medication. Will send a script for HCTZ 12.5mg and carvedilol 12.5mg BID. Will also send a separate script for valsartan-HCTZ 80-12.5mg, which she should only start in place of her HCTZ if her BP is suboptimally controlled while abroad (>135/85). Patient verbally expressed understanding this plan and to not take both HCTZ and valsartan-HCTZ at the same time.\par - Discontinue amlodipine.\par - Rx compression stockings\par - Continue statin\par - OK to continue aspirin for now given no history of bleeding events and well tolerability. No strong indication based on recent guideline changes.\par - Obtain cardiometabolic labs.\par \par RTC after returning from [facility]

## 2023-06-22 ENCOUNTER — APPOINTMENT (OUTPATIENT)
Dept: PLASTIC SURGERY | Facility: CLINIC | Age: 73
End: 2023-06-22

## 2023-12-06 LAB
ALBUMIN SERPL ELPH-MCNC: 4.4 G/DL
ALP BLD-CCNC: 58 U/L
ALT SERPL-CCNC: 15 U/L
ANION GAP SERPL CALC-SCNC: 10 MMOL/L
AST SERPL-CCNC: 21 U/L
BILIRUB SERPL-MCNC: 0.3 MG/DL
BUN SERPL-MCNC: 22 MG/DL
CALCIUM SERPL-MCNC: 10.1 MG/DL
CHLORIDE SERPL-SCNC: 103 MMOL/L
CHOLEST SERPL-MCNC: 166 MG/DL
CO2 SERPL-SCNC: 30 MMOL/L
CREAT SERPL-MCNC: 0.9 MG/DL
EGFR: 68 ML/MIN/1.73M2
ESTIMATED AVERAGE GLUCOSE: 117 MG/DL
GLUCOSE SERPL-MCNC: 100 MG/DL
HBA1C MFR BLD HPLC: 5.7 %
HDLC SERPL-MCNC: 52 MG/DL
LDLC SERPL CALC-MCNC: 93 MG/DL
NONHDLC SERPL-MCNC: 114 MG/DL
POTASSIUM SERPL-SCNC: 4.1 MMOL/L
PROT SERPL-MCNC: 7.1 G/DL
SODIUM SERPL-SCNC: 143 MMOL/L
TRIGL SERPL-MCNC: 104 MG/DL
TSH SERPL-ACNC: 2.39 UIU/ML

## 2023-12-07 LAB — CRP SERPL HS-MCNC: 1.77 MG/L

## 2023-12-27 ENCOUNTER — APPOINTMENT (OUTPATIENT)
Dept: CARDIOLOGY | Facility: CLINIC | Age: 73
End: 2023-12-27
Payer: MEDICARE

## 2023-12-27 VITALS
DIASTOLIC BLOOD PRESSURE: 80 MMHG | BODY MASS INDEX: 28.12 KG/M2 | HEIGHT: 66 IN | WEIGHT: 175 LBS | HEART RATE: 77 BPM | SYSTOLIC BLOOD PRESSURE: 126 MMHG

## 2023-12-27 PROCEDURE — 93000 ELECTROCARDIOGRAM COMPLETE: CPT

## 2023-12-27 PROCEDURE — 99214 OFFICE O/P EST MOD 30 MIN: CPT

## 2023-12-27 RX ORDER — HYDROCHLOROTHIAZIDE 12.5 MG/1
12.5 CAPSULE ORAL DAILY
Qty: 90 | Refills: 3 | Status: DISCONTINUED | COMMUNITY
End: 2023-12-27

## 2023-12-27 RX ORDER — VALSARTAN AND HYDROCHLOROTHIAZIDE 80; 12.5 MG/1; MG/1
80-12.5 TABLET, FILM COATED ORAL DAILY
Qty: 90 | Refills: 3 | Status: DISCONTINUED | COMMUNITY
Start: 2023-06-13 | End: 2023-12-27

## 2023-12-30 RX ORDER — PANTOPRAZOLE 40 MG/1
40 TABLET, DELAYED RELEASE ORAL
Refills: 0 | Status: ACTIVE | COMMUNITY

## 2023-12-30 NOTE — ASSESSMENT
[FreeTextEntry1] : Ms. Cheung is a 73-year-old woman with history of HTN, hyperlipidemia, and mitral regurgitation presenting for follow up.  Impression: (1) HTN, well controlled (2) HLD, LDL <100 (3) Mild/mod MR, no sx but only exerts herself to a low workload  Plan: - Continue compression stockings - Continue statin - OK to continue aspirin for now given no history of bleeding events and well tolerability. No strong indication based on recent guideline changes. - Obtain cardiometabolic labs.  RTC prior to leaving to [facility] this Summer

## 2023-12-30 NOTE — HISTORY OF PRESENT ILLNESS
[FreeTextEntry1] : Ms. Cheung is a 73-year-old woman with history of HTN, hyperlipidemia, and mitral regurgitation presenting for follow up.  Patient previously followed with Dr. Almazan and was last seen in clinic on 4/2022. At that time was noted to be doing well without complaints. She was hypertensive to 148/78 and was started on amlodipine.  She denies cardiopulmonary complaints including chest pain, dyspnea, palpitations, pre-syncope, syncope, PND, or orthopnea.  She did develop LE swelling while on amlodipine 10mg and was switched to 5mg. Symptoms of swelling are worst at night and almost completely resolve in the AM.  Her mother and father both passed away young (40-50s) but not from cardiac issues.  Labs: - 2021: Cr 0.8, K 4.7, Hgb 12.6, Vit D 29 (L) - 2021: , TG 74, HDL 55, LDL 88, non- - Hgb 12.4, Plt 205 - Cr 0.9, BUN 22, K 4.1 - TSH 2.39, A1c 5.7%, hs CRP 1.77 - , , HDL 52, LDL 93, non-  TTE 3/2023 - Normal biventricular function, mild MR, fibrocalcific aortic valve TTE 2021 - Normal biventricular function, mild MR, trivial AI, mild/mod TR, aortic sclerosis

## 2024-03-26 ENCOUNTER — APPOINTMENT (OUTPATIENT)
Dept: PLASTIC SURGERY | Facility: CLINIC | Age: 74
End: 2024-03-26
Payer: MEDICARE

## 2024-03-26 VITALS — HEIGHT: 66 IN | BODY MASS INDEX: 27.64 KG/M2 | WEIGHT: 172 LBS

## 2024-03-26 PROCEDURE — 99203 OFFICE O/P NEW LOW 30 MIN: CPT | Mod: 25

## 2024-03-26 PROCEDURE — 20550 NJX 1 TENDON SHEATH/LIGAMENT: CPT | Mod: XS,LT

## 2024-03-26 NOTE — ASSESSMENT
[FreeTextEntry1] : Regarding treatment of trigger finger patient aware of possible lack of response to steroid injection and possible need for additional steroid injection or possible surgical trigger finger release.  All questions answered  injected 5mg kelanlog into each trigger (rigth thumb and elft third)  f/u 6 wks

## 2024-03-26 NOTE — PHYSICAL EXAM
[NI] : Normal [de-identified] : left third trigger and right thumb trigger   both tender over A1 pulley

## 2024-03-26 NOTE — HISTORY OF PRESENT ILLNESS
[FreeTextEntry1] : 74 yr old woman RHD has rigth thumb and left third finger triggering  nonsmoker nondiabetic no injury to fingers no prior tx been an issue for over one year

## 2024-05-07 ENCOUNTER — APPOINTMENT (OUTPATIENT)
Dept: PLASTIC SURGERY | Facility: CLINIC | Age: 74
End: 2024-05-07

## 2024-06-07 LAB
ALBUMIN SERPL ELPH-MCNC: 4.1 G/DL
ALP BLD-CCNC: 51 U/L
ALT SERPL-CCNC: 16 U/L
ANION GAP SERPL CALC-SCNC: 12 MMOL/L
AST SERPL-CCNC: 23 U/L
BILIRUB SERPL-MCNC: 0.5 MG/DL
BUN SERPL-MCNC: 17 MG/DL
CALCIUM SERPL-MCNC: 9.6 MG/DL
CHLORIDE SERPL-SCNC: 105 MMOL/L
CHOLEST SERPL-MCNC: 167 MG/DL
CO2 SERPL-SCNC: 28 MMOL/L
CREAT SERPL-MCNC: 0.9 MG/DL
CRP SERPL HS-MCNC: 1.03 MG/L
EGFR: 68 ML/MIN/1.73M2
ESTIMATED AVERAGE GLUCOSE: 123 MG/DL
GLUCOSE SERPL-MCNC: 97 MG/DL
HBA1C MFR BLD HPLC: 5.9 %
HCT VFR BLD CALC: 38.1 %
HDLC SERPL-MCNC: 50 MG/DL
HGB BLD-MCNC: 12.2 G/DL
LDLC SERPL CALC-MCNC: 98 MG/DL
MCHC RBC-ENTMCNC: 30 PG
MCHC RBC-ENTMCNC: 32 G/DL
MCV RBC AUTO: 93.8 FL
NONHDLC SERPL-MCNC: 117 MG/DL
NT-PROBNP SERPL-MCNC: 94 PG/ML
PLATELET # BLD AUTO: 209 K/UL
PMV BLD AUTO: 0 /100 WBCS
PMV BLD: 11.6 FL
POTASSIUM SERPL-SCNC: 4.2 MMOL/L
PROT SERPL-MCNC: 7 G/DL
RBC # BLD: 4.06 M/UL
RBC # FLD: 12.7 %
SODIUM SERPL-SCNC: 145 MMOL/L
TRIGL SERPL-MCNC: 95 MG/DL
TSH SERPL-ACNC: 2.76 UIU/ML
WBC # FLD AUTO: 4.68 K/UL

## 2024-06-21 ENCOUNTER — NON-APPOINTMENT (OUTPATIENT)
Age: 74
End: 2024-06-21

## 2024-06-21 ENCOUNTER — APPOINTMENT (OUTPATIENT)
Dept: CARDIOLOGY | Facility: CLINIC | Age: 74
End: 2024-06-21
Payer: MEDICARE

## 2024-06-21 VITALS
HEART RATE: 58 BPM | BODY MASS INDEX: 27.32 KG/M2 | DIASTOLIC BLOOD PRESSURE: 84 MMHG | WEIGHT: 170 LBS | SYSTOLIC BLOOD PRESSURE: 122 MMHG | HEIGHT: 66 IN

## 2024-06-21 VITALS — BODY MASS INDEX: 27.32 KG/M2 | HEIGHT: 66 IN | WEIGHT: 170 LBS

## 2024-06-21 DIAGNOSIS — Z00.00 ENCOUNTER FOR GENERAL ADULT MEDICAL EXAMINATION W/OUT ABNORMAL FINDINGS: ICD-10-CM

## 2024-06-21 DIAGNOSIS — E78.5 HYPERLIPIDEMIA, UNSPECIFIED: ICD-10-CM

## 2024-06-21 DIAGNOSIS — I10 ESSENTIAL (PRIMARY) HYPERTENSION: ICD-10-CM

## 2024-06-21 PROCEDURE — 99213 OFFICE O/P EST LOW 20 MIN: CPT

## 2024-06-21 PROCEDURE — G2211 COMPLEX E/M VISIT ADD ON: CPT

## 2024-06-21 PROCEDURE — 93000 ELECTROCARDIOGRAM COMPLETE: CPT

## 2024-06-21 RX ORDER — VALSARTAN AND HYDROCHLOROTHIAZIDE 80; 12.5 MG/1; MG/1
80-12.5 TABLET, FILM COATED ORAL
Qty: 180 | Refills: 3 | Status: ACTIVE | COMMUNITY
Start: 1900-01-01 | End: 1900-01-01

## 2024-06-21 RX ORDER — ATORVASTATIN CALCIUM 20 MG/1
20 TABLET, FILM COATED ORAL DAILY
Qty: 90 | Refills: 3 | Status: ACTIVE | COMMUNITY
Start: 1900-01-01 | End: 1900-01-01

## 2024-06-24 NOTE — ASSESSMENT
[FreeTextEntry1] : Ms. Cheung is a 74-year-old woman with history of HTN, hyperlipidemia, and mitral regurgitation presenting for follow up.  Impression: (1) HTN, well controlled (2) HLD, LDL <100 (3) Mild/mod MR, no sx but only exerts herself to a low workload  Plan: - Continue compression stockings - Continue statin - OK to continue aspirin for now given no history of bleeding events and well tolerability. - Obtain cardiometabolic labs.  RTC prior to leaving to [facility] this Summer

## 2024-06-24 NOTE — HISTORY OF PRESENT ILLNESS
[FreeTextEntry1] : Ms. Cheung is a 74-year-old woman with history of HTN, hyperlipidemia, and mitral regurgitation presenting for follow up.  Patient previously followed with Dr. Almazan and was last seen by him in clinic on 4/2022. At that time was noted to be doing well without complaints. She was hypertensive to 148/78 and was started on amlodipine.  She denies cardiopulmonary complaints including chest pain, dyspnea, palpitations, pre-syncope, syncope, PND, or orthopnea.  She did develop LE swelling while on amlodipine 10mg and was switched to 5mg. Symptoms of swelling are worst at night and almost completely resolve in the AM.  Her mother and father both passed away young (40-50s) but not from cardiac issues.  TTE 3/2023 - Normal biventricular function, mild MR, fibrocalcific aortic valve TTE 2021 - Normal biventricular function, mild MR, trivial AI, mild/mod TR, aortic sclerosis  Labs: - Hgb 12.4, Plt 205 - Cr 0.9, BUN 17, K 4.2 - TSH 2.76, A1c 5.9%, hs CRP 1.03, pBNP 94 - , TG 95, HDL 50, LDL 98, non-  Meds: - ASA 81mg - Atorva 20mg - Carvedilol 12.5mg BID - Valsartan-HCTZ 80-12.5mg - MV

## 2024-09-02 ENCOUNTER — NON-APPOINTMENT (OUTPATIENT)
Age: 74
End: 2024-09-02

## 2024-09-09 DIAGNOSIS — I10 ESSENTIAL (PRIMARY) HYPERTENSION: ICD-10-CM

## 2024-09-09 DIAGNOSIS — E78.5 HYPERLIPIDEMIA, UNSPECIFIED: ICD-10-CM

## 2024-09-09 DIAGNOSIS — Z00.00 ENCOUNTER FOR GENERAL ADULT MEDICAL EXAMINATION W/OUT ABNORMAL FINDINGS: ICD-10-CM

## 2024-09-09 DIAGNOSIS — M79.89 OTHER SPECIFIED SOFT TISSUE DISORDERS: ICD-10-CM

## 2024-09-27 ENCOUNTER — APPOINTMENT (OUTPATIENT)
Dept: CARDIOLOGY | Facility: CLINIC | Age: 74
End: 2024-09-27
Payer: MEDICARE

## 2024-09-27 VITALS
DIASTOLIC BLOOD PRESSURE: 70 MMHG | HEART RATE: 81 BPM | HEIGHT: 66 IN | BODY MASS INDEX: 27.32 KG/M2 | SYSTOLIC BLOOD PRESSURE: 144 MMHG | WEIGHT: 170 LBS

## 2024-09-27 DIAGNOSIS — I10 ESSENTIAL (PRIMARY) HYPERTENSION: ICD-10-CM

## 2024-09-27 DIAGNOSIS — E78.5 HYPERLIPIDEMIA, UNSPECIFIED: ICD-10-CM

## 2024-09-27 DIAGNOSIS — Z00.00 ENCOUNTER FOR GENERAL ADULT MEDICAL EXAMINATION W/OUT ABNORMAL FINDINGS: ICD-10-CM

## 2024-09-27 PROCEDURE — 93000 ELECTROCARDIOGRAM COMPLETE: CPT

## 2024-09-27 PROCEDURE — 99213 OFFICE O/P EST LOW 20 MIN: CPT

## 2024-09-27 PROCEDURE — G2211 COMPLEX E/M VISIT ADD ON: CPT

## 2024-09-27 NOTE — ASSESSMENT
[FreeTextEntry1] : Ms. Cheung is a 74-year-old woman with history of HTN, hyperlipidemia, and mitral regurgitation presenting for follow up.  Impression: (1) HTN, well controlled (2) HLD, LDL <100 (3) Mild/mod MR, no sx but only exerts herself to a low workload  Plan: - Continue compression stockings - Continue statin - OK to continue aspirin for now given no history of bleeding events and well tolerability. - Obtain cardiometabolic labs.  RTC next Summer

## 2024-09-27 NOTE — HISTORY OF PRESENT ILLNESS
[FreeTextEntry1] : Ms. Cheung is a 74-year-old woman with history of HTN, hyperlipidemia, and mitral regurgitation presenting for follow up.  Patient previously followed with Dr. Almazan and was last seen by him in clinic on 4/2022. At that time was noted to be doing well without complaints. She was hypertensive to 148/78 and was started on amlodipine.  She denies cardiopulmonary complaints including chest pain, dyspnea, palpitations, pre-syncope, syncope, PND, or orthopnea. She did develop LE swelling while on amlodipine 10mg and was switched to 5mg. Symptoms of swelling are worst at night and almost completely resolve in the AM.  Her mother and father both passed away young (40-50s) but not from cardiac issues.  Recently went on vacation to [facility] and to [place] without issues.  TTE 3/2023 - Normal biventricular function, mild MR, fibrocalcific aortic valve TTE 2021 - Normal biventricular function, mild MR, trivial AI, mild/mod TR, aortic sclerosis  Labs: - Hgb 12.4, Plt 205 - Cr 0.9, BUN 17, K 4.2 - TSH 2.76, A1c 5.9%, hs CRP 1.03, pBNP 94 - , TG 95, HDL 50, LDL 98, non-  Meds: - ASA 81mg - Atorva 20mg - Carvedilol 12.5mg BID - Valsartan-HCTZ 80-12.5mg - MV

## 2025-01-21 ENCOUNTER — APPOINTMENT (OUTPATIENT)
Dept: PLASTIC SURGERY | Facility: CLINIC | Age: 75
End: 2025-01-21
Payer: MEDICARE

## 2025-01-21 PROCEDURE — 99212 OFFICE O/P EST SF 10 MIN: CPT | Mod: 25

## 2025-01-21 PROCEDURE — 20550 NJX 1 TENDON SHEATH/LIGAMENT: CPT | Mod: RT

## 2025-06-16 ENCOUNTER — NON-APPOINTMENT (OUTPATIENT)
Age: 75
End: 2025-06-16

## 2025-06-16 ENCOUNTER — APPOINTMENT (OUTPATIENT)
Dept: CARDIOLOGY | Facility: CLINIC | Age: 75
End: 2025-06-16
Payer: MEDICARE

## 2025-06-16 VITALS
HEIGHT: 66 IN | BODY MASS INDEX: 27.32 KG/M2 | DIASTOLIC BLOOD PRESSURE: 82 MMHG | SYSTOLIC BLOOD PRESSURE: 150 MMHG | HEART RATE: 59 BPM | WEIGHT: 170 LBS

## 2025-06-16 PROCEDURE — 99213 OFFICE O/P EST LOW 20 MIN: CPT | Mod: 25

## 2025-06-16 PROCEDURE — 93000 ELECTROCARDIOGRAM COMPLETE: CPT

## 2025-06-16 RX ORDER — OLMESARTAN MEDOXOMIL AND HYDROCHLOROTHIAZIDE 40; 25 MG/1; MG/1
40-25 TABLET ORAL
Qty: 90 | Refills: 3 | Status: ACTIVE | COMMUNITY
Start: 2025-06-16 | End: 1900-01-01